# Patient Record
Sex: MALE | Race: WHITE
[De-identification: names, ages, dates, MRNs, and addresses within clinical notes are randomized per-mention and may not be internally consistent; named-entity substitution may affect disease eponyms.]

---

## 2018-01-09 ENCOUNTER — HOSPITAL ENCOUNTER (EMERGENCY)
Dept: HOSPITAL 41 - JD.ED | Age: 68
Discharge: HOME | End: 2018-01-09
Payer: MEDICARE

## 2018-01-09 DIAGNOSIS — G44.209: Primary | ICD-10-CM

## 2018-01-09 DIAGNOSIS — Z87.891: ICD-10-CM

## 2018-01-09 DIAGNOSIS — I10: ICD-10-CM

## 2018-01-09 DIAGNOSIS — M62.81: ICD-10-CM

## 2018-01-09 DIAGNOSIS — E11.9: ICD-10-CM

## 2018-01-09 DIAGNOSIS — Z79.84: ICD-10-CM

## 2018-01-09 NOTE — CT
CT cervical spine

 

Technique: Multiple axial sections were obtained from above C1 

inferiorly to the bottom of T1.  Reconstructed sagittal and coronal 

images were reviewed.

 

Findings: Mastoid sinuses and middle ear cavities are clear.  

Posterior skull base is intact.  Vertebral body heights and disc 

spaces are maintained.  Vertebral bodies and posterior arches are 

intact with no fracture being seen.  No bony central or bony neural 

foraminal stenosis is seen.  Incidental ligamentum nuchal 

calcification is seen.  No abnormal subluxation is seen.

 

Impression:

1.  Nothing acute is seen on CT study of the cervical spine.  No 

significant degenerative change is seen.

 

Diagnostic code #2
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
## 2018-01-09 NOTE — CT
Head CT

 

Technique: Multiple axial sections through the brain were obtained.  

Intravenous contrast was not utilized.

 

Comparison: No prior intracranial imaging.

 

Findings: Ventricles along with basal cisterns and sulci over the 

convexities are within normal limits for the patient's age.  No 

abnormal parenchymal densities are seen.  No evidence of intracranial 

hemorrhage.  No midline shift or mass effect is seen.

 

Bone window settings were reviewed which shows minimal mucosal 

thickening within the ethmoid sinuses.  No acute calvarial abnormality

 is seen.

 

Impression:

1.  Minimal sinus findings which are incidental.

2.  No acute intracranial abnormality is identified on noncontrast 

head CT study.

 

Diagnostic code #2 23-Jan-2017 22:48

## 2019-02-22 ENCOUNTER — HOSPITAL ENCOUNTER (OUTPATIENT)
Dept: HOSPITAL 41 - JD.SDS | Age: 69
Discharge: HOME | End: 2019-02-22
Payer: MEDICARE

## 2019-02-22 DIAGNOSIS — Z88.8: ICD-10-CM

## 2019-02-22 DIAGNOSIS — Z79.84: ICD-10-CM

## 2019-02-22 DIAGNOSIS — Z12.11: Primary | ICD-10-CM

## 2019-02-22 DIAGNOSIS — E78.00: ICD-10-CM

## 2019-02-22 DIAGNOSIS — J44.9: ICD-10-CM

## 2019-02-22 DIAGNOSIS — Z83.71: ICD-10-CM

## 2019-02-22 DIAGNOSIS — Z79.899: ICD-10-CM

## 2019-02-22 DIAGNOSIS — K63.89: ICD-10-CM

## 2019-02-22 DIAGNOSIS — Z87.891: ICD-10-CM

## 2019-02-22 DIAGNOSIS — I10: ICD-10-CM

## 2019-02-22 DIAGNOSIS — E11.9: ICD-10-CM
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
## 2019-02-22 NOTE — OR
DATE OF OPERATION:  02/22/2019

 

SURGEON:  Vishal Pfeiffer MD

 

PREOPERATIVE DIAGNOSIS:

Screening colonoscopy.

 

POSTOPERATIVE DIAGNOSIS:

Screening colonoscopy.

 

OPERATION PERFORMED:  Total colonoscopy.

 

ANESTHESIA:

MAC.

 

SPECIMEN:

None.

 

OPERATIVE FINDING:

Significant melanosis coli indicating laxative use.

 

RECOMMENDATION:

Followup screening colonoscopy in 10 years or sooner for symptoms.

 

INDICATION FOR PROCEDURE:

This 68-year-old male was referred for screening colonoscopy.

 

DESCRIPTION OF PROCEDURE:

After adequate preparation, a colonoscope was inserted into the rectum.  This

was passed all the way to the cecum.  Confirmation of the cecum was made by

visualization of the ileocecal valve and palpation in the right lower quadrant.

The bowel prep was good.  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  Photographs

of different sections of the colon were taken.  This made it somewhat difficult

to evaluate the mucosa adequately, but I did not find any evidence of polyp

growth or abnormalities.  Air was suctioned from the colon and rectum and the

scope removed.

 

ESTIMATED BLOOD LOSS:

 

 

DD:  02/22/2019 10:55:38

DT:  02/22/2019 11:29:00  MMODAL

Job #:  [id number]

## 2019-11-02 ENCOUNTER — HOSPITAL ENCOUNTER (EMERGENCY)
Dept: HOSPITAL 41 - JD.ED | Age: 69
Discharge: HOME | End: 2019-11-02
Payer: MEDICARE

## 2019-11-02 DIAGNOSIS — Z79.899: ICD-10-CM

## 2019-11-02 DIAGNOSIS — I10: ICD-10-CM

## 2019-11-02 DIAGNOSIS — E11.9: ICD-10-CM

## 2019-11-02 DIAGNOSIS — Z79.84: ICD-10-CM

## 2019-11-02 DIAGNOSIS — Z88.8: ICD-10-CM

## 2019-11-02 DIAGNOSIS — G44.209: Primary | ICD-10-CM

## 2019-11-02 PROCEDURE — 70450 CT HEAD/BRAIN W/O DYE: CPT

## 2019-11-02 PROCEDURE — 96374 THER/PROPH/DIAG INJ IV PUSH: CPT

## 2019-11-02 PROCEDURE — 99284 EMERGENCY DEPT VISIT MOD MDM: CPT

## 2019-11-02 PROCEDURE — 96375 TX/PRO/DX INJ NEW DRUG ADDON: CPT

## 2019-11-02 NOTE — CT
Head CT

 

Technique: Multiple axial sections through the brain were obtained.  

Intravenous contrast was not utilized.

 

Comparison: No prior intracranial imaging is available.

 

Findings: Ventricles along with basal cisterns and sulci over the 

convexities appear within normal limits for the patient's age.  No 

abnormal parenchymal densities are seen.  No evidence of intracranial 

hemorrhage.  No midline shift or mass effect is seen.

 

Mild areas of mucosal thickening are seen within the ethmoid and 

frontal sinuses.  No air-fluid levels are seen within the paranasal 

sinuses.  Mastoid sinuses are clear.  No acute calvarial abnormality 

is seen.

 

Impression:

1.  Mild sinus findings which are most likely chronic.

2.  No acute intracranial abnormality is identified on noncontrast 

head CT exam.

 

Diagnostic code #2
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
